# Patient Record
Sex: FEMALE | Race: WHITE | NOT HISPANIC OR LATINO
[De-identification: names, ages, dates, MRNs, and addresses within clinical notes are randomized per-mention and may not be internally consistent; named-entity substitution may affect disease eponyms.]

---

## 2020-08-25 PROBLEM — Z00.00 ENCOUNTER FOR PREVENTIVE HEALTH EXAMINATION: Status: ACTIVE | Noted: 2020-08-25

## 2024-05-07 ENCOUNTER — APPOINTMENT (OUTPATIENT)
Dept: ORTHOPEDIC SURGERY | Facility: CLINIC | Age: 34
End: 2024-05-07

## 2024-06-05 ENCOUNTER — APPOINTMENT (OUTPATIENT)
Dept: ORTHOPEDIC SURGERY | Facility: CLINIC | Age: 34
End: 2024-06-05

## 2024-06-05 DIAGNOSIS — Z78.9 OTHER SPECIFIED HEALTH STATUS: ICD-10-CM

## 2024-06-05 PROCEDURE — 99204 OFFICE O/P NEW MOD 45 MIN: CPT

## 2024-06-05 PROCEDURE — 72050 X-RAY EXAM NECK SPINE 4/5VWS: CPT

## 2024-06-05 PROCEDURE — 72110 X-RAY EXAM L-2 SPINE 4/>VWS: CPT
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
## 2024-06-05 NOTE — PHYSICAL EXAM
[de-identified] :  General: No acute distress, conversant, well-nourished. Head: Normocephalic, atraumatic Neck: trachea midline, FROM Heart: normotensive and normal rate and rhythm Lungs: No labored breathing Skin: No abrasions, no rashes, no edema Psych: Alert and oriented to person, place and time Extremities: no peripheral edema or digital cyanosis Gait: Normal gait. Can perform tandem gait.  Vascular: warm and well perfused distally, palpable distal pulses  MSK: Spine: + tenderness to palpation.  No step-off, no deformity.   NEURO: Sensation          Left           C5     2/2               C6     2/2               C7     2/2               C8     2/2              T1     2/2                        Right         C5     1/2               C6     1/2               C7     2/2               C8     2/2              T1     2/2        Motor:                                                Left             C5 (deltoid abduction)             5/5               C6 (biceps flexion)                   5/5                C7 (triceps extension)             5/5               C8 (finger flexion)                     5/5               T1 (interosseous)                     5/5                                                            Right           C5 (deltoid abduction)             5/5               C6 (biceps flexion)                   5/5                C7 (triceps extension)             5/5               C8 (finger flexion)                     5/5               T1 (interosseous)                     5/5                       Sensation Left L2  -  2/2            Left L3  -  2/2 Left L4  -  1/2 Left L5  -  1/2 Left S1  -  2/2   Right L2  -  2/2            Right L3  -  2/2 Right L4  -  2/2 Right L5  -  2/2 Right S1  -  2/2   Motor: Left L2 (hip flexion)                            5/5                Left L3 (knee extension)                   5/5                Left L4 (ankle dorsiflexion)                 5/5                Left L5 (long toe extensor)                5/5                Left S1 (ankle plantar flexion)           5/5   Right L2 (hip flexion)                            5/5                Right L3 (knee extension)                   5/5                Right L4 (ankle dorsiflexion)                 5/5                Right L5 (long toe extensor)                5/5                Right S1 (ankle plantar flexion)           5/5   Reflexes: Normal and symmetric Negative Spurlings test.  Negative Hoffmans reflex.  Negative clonus.  Down-going Babinski. [de-identified] : I ordered radiographs to evaluate the patient's symptoms.   Cervical 4 view radiographs taken in the office today show no dislocation or fracture. Cervical spondylosis.  No instability on dynamic series. ACDF hardware in appropriate position and alignment.   Lumbar 4 view radiographs taken in the office today show no dislocation or fracture.  Lumbar spondylosis.  No instability on dynamic series. TLIF hardware in appropriate position and alignment.   I independently reviewed CT scan which showed possible pseudoarthrosis of lumbar spine.  I independently reviewed MRI which showed severe foraminal stenosis at L3-L4, above level of her previous surgery.
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
## 2024-06-20 ENCOUNTER — APPOINTMENT (OUTPATIENT)
Dept: CT IMAGING | Facility: CLINIC | Age: 34
End: 2024-06-20
Payer: MEDICARE

## 2024-06-20 ENCOUNTER — APPOINTMENT (OUTPATIENT)
Dept: MRI IMAGING | Facility: CLINIC | Age: 34
End: 2024-06-20
Payer: MEDICARE

## 2024-06-20 PROCEDURE — 72158 MRI LUMBAR SPINE W/O & W/DYE: CPT | Mod: MH

## 2024-06-20 PROCEDURE — 72131 CT LUMBAR SPINE W/O DYE: CPT | Mod: MH

## 2024-06-20 PROCEDURE — A9585: CPT

## 2024-06-24 ENCOUNTER — TRANSCRIPTION ENCOUNTER (OUTPATIENT)
Age: 34
End: 2024-06-24

## 2024-06-24 ENCOUNTER — NON-APPOINTMENT (OUTPATIENT)
Age: 34
End: 2024-06-24

## 2024-06-27 ENCOUNTER — APPOINTMENT (OUTPATIENT)
Dept: ORTHOPEDIC SURGERY | Facility: CLINIC | Age: 34
End: 2024-06-27
Payer: MEDICARE

## 2024-06-27 DIAGNOSIS — M54.50 LOW BACK PAIN, UNSPECIFIED: ICD-10-CM

## 2024-06-27 DIAGNOSIS — Z98.890 OTHER SPECIFIED POSTPROCEDURAL STATES: ICD-10-CM

## 2024-06-27 PROCEDURE — 99204 OFFICE O/P NEW MOD 45 MIN: CPT

## 2024-06-30 PROBLEM — M54.50 LUMBAR BACK PAIN: Status: ACTIVE | Noted: 2024-06-05

## 2024-06-30 PROBLEM — Z98.890 HISTORY OF SPINAL SURGERY: Status: ACTIVE | Noted: 2024-06-05

## 2024-11-13 ENCOUNTER — TRANSCRIPTION ENCOUNTER (OUTPATIENT)
Age: 34
End: 2024-11-13

## 2024-11-19 ENCOUNTER — APPOINTMENT (OUTPATIENT)
Dept: ORTHOPEDIC SURGERY | Facility: CLINIC | Age: 34
End: 2024-11-19

## 2024-11-19 DIAGNOSIS — Z98.1 ARTHRODESIS STATUS: ICD-10-CM

## 2024-11-19 DIAGNOSIS — M54.12 RADICULOPATHY, CERVICAL REGION: ICD-10-CM

## 2024-11-19 PROCEDURE — 99214 OFFICE O/P EST MOD 30 MIN: CPT

## 2024-11-19 RX ORDER — GABAPENTIN 300 MG/1
300 CAPSULE ORAL 3 TIMES DAILY
Qty: 180 | Refills: 1 | Status: ACTIVE | COMMUNITY
Start: 2024-11-19 | End: 1900-01-01

## 2024-11-19 NOTE — REASON FOR VISIT
Thank you!     Thank you for allowing us to provide you with excellent care today. We hope we addressed all of your concerns and needs. We strive to provide excellent quality care in the Emergency Department. You will receive a survey after your visit to evaluate the care you were provided.      Please rate us a level 5 (excellent), as anything less than excellent does not meet our goals.      If you feel that you have not received excellent quality care or timely care, please ask to speak to the nurse manager. Please choose us in the future for your continued health care needs. ______________________________________________________________________    The exam and treatment you received in the Emergency Department were for an urgent problem and are not intended as complete care. It is important that you follow-up with a doctor, nurse practitioner, or physician assistant to:  (1) confirm your diagnosis,  (2) re-evaluation of changes in your illness and treatment, and  (3) for ongoing care. If your symptoms become worse or you do not improve as expected and you are unable to reach your usual health care provider, you should return to the Emergency Department. We are available 24 hours a day. Take this sheet with you when you go to your follow-up visit. If you have any problem arranging the follow-up visit, contact [address] [place] [address] 21 [phone])    Make an appointment with your Primary Care doctor for follow up of this visit. Return to the ER if you are unable to be seen in the time recommended on your discharge instructions. White: Instrucciones de cuidado - [ Cuts: Care Instructions ]  Instrucciones de cuidado  Un juliocesar puede ocurrir en cualquier parte del cuerpo. A veces, se usan puntos de sutura, grapas, [employer] para la piel o cintas Jaama [address] llamadas Steri-Strips para mantener juntos los bordes de un juliocesar y ayudar a que sane.  Las cintas Steri-Strip pueden usarse por sí solas o junto con puntos de sutura o [Follow-Up Visit] : a follow-up visit for [Back Pain] : back pain grapas. Otras veces, se jose luis los deutsch abiertos. Si el juliocesar es profundo y [employer], es posible que el médico haya cerrado el juliocesar en dos capas. Gem capa más profunda de puntos de sutura junta la parte profunda del juliocesar. Estos puntos se disuelven y no es necesario extraerlos. El cierre de la capa superior, que puede [place] por medio de anup, [place], Steri-Strips o Tor, es lo que se puede natividad sobre el juliocesar. Con frecuencia, un juliocesar se cubre con gem venda. El médico lo ha examinado minuciosamente, nicole pueden presentarse problemas más tarde. Si nota algún problema o nuevos síntomas, busque tratamiento médico de inmediato. La atención de seguimiento es gem parte clave de tomlinson tratamiento y seguridad. Asegúrese de hacer y acudir a todas las citas, y llame a tomlinson médico si está teniendo problemas. También es gem buena idea saber los resultados de laurie exámenes y mantener gem lista de los medicamentos que ryan. ¿Cómo puede cuidarse en el hogar? Si un juliocesar está abierto o cerrado  · Apoye la piyush afectada sobre gem almohada en cualquier momento que esté sentado o acostado antwon los 3 días siguientes. Trate de mantenerla por encima del nivel del corazón. [place] ayudará a reducir la hinchazón. · Mantenga la herida seca antwon las primeras 24 a 48 horas. Después de [employer], puede ducharse si el médico lo [place]. Seque el juliocesar con toques suaves de toalla. · No remoje el juliocesar, attila en gem addison (bañera). [place] médico le indicará cuándo es seguro mojar el juliocesar. · Después de las primeras 24 a 48 horas, limpie el juliocesar con agua y jabón 2 veces al día, a menos que el médico le dé indicaciones diferentes. ? No use peróxido de hidrógeno (agua [place]) ni alcohol, los cuales pueden retrasar la sanación. ? Puede cubrir el juliocesar con gem capa delgada de vaselina y gem venda antiadherente.   ? Si el médico colocó gem venda sobre el juliocesar, colóquese gem venda nueva después de limpiar el juliocesar o si la [FreeTextEntry2] : MRI REVIEW [facility] venda se moja o se ensucia. · Evite cualquier actividad que pudiera hacer que el juliocesar se hamida de nuevo. · Sea kip con los medicamentos. Jeny y siga todas las indicaciones de la [place]. ? Si el médico le recetó un analgésico (medicamento para el dolor), tómelo según las indicaciones. ? Si no está tomando un analgésico recetado, pregúntele a tomlinson médico si puede demetrius carey de [employer]. Si se cerró el juliocesar con puntos, grapas o Steri-Strips  · Siga las instrucciones anteriores para los deutsch abiertos o cerrados. · No se quite los puntos o las grapas por sí mismo. El médico le indicará cuándo debe volver para que le quiten los [address] grapas. · Deje puestas las Steri-Strips hasta que se desprendan por sí solas. Si se cerró el juliocesar con un adhesivo para la piel  · Siga las instrucciones anteriores para los deutsch abiertos o cerrados. · Déjese puesto el [employer] sobre la piel hasta que se desprenda por sí solo. [place] puede tardar entre 5 y 7 días. · No se rasque, frote ni hurgue el [employer]. · No se aplique la parte pegajosa de lulu venda directamente sobre el [employer]. · No se aplique ningún tipo de pomada, crema o loción sobre la piyush. [place] puede hacer que el [employer] se desprenda demasiado pronto. No use peróxido de hidrógeno (agua [place]) ni alcohol, los cuales pueden retrasar la sanación. ¿Cuándo debes pedir ayuda? Llama a tu médico ahora mismo o busca atención médica inmediata si:    · Tienes dolor nuevo, o el dolor empeora.     · La piel cerca del juliocesar está fría o pálida, o cambia de color.     · Sientes hormigueo, debilitamiento o entumecimiento cerca del juliocesar.     · El juliocesar comienza a sangrar, y la rossi empapa la venda. Es normal que salga lulu pequeña cantidad de rossi.     · Tienes dificultades para  la piyush cercana al juliocesar.     · Tienes síntomas de infección, tales attila:  ? Aumento del dolor, la hinchazón, el enrojecimiento o la temperatura alrededor del juliocesar. ?  Vetas rojizas que comienzan en el juliocesar. ? Pus que sale del juliocesar. ? [place] especial atención a los cambios en tu tao y asegúrate de comunicarte con tu médico si:    · El juliocesar vuelve a abrirse.     · No mejoras attila se esperaba. ¿Dónde puede encontrar más información en inglés? Fani edwards http://hugo-clair.info/. Michell Countess M735 en la búsqueda para aprender más acerca de \"White: Instrucciones de cuidado - [ Cuts: Care Instructions ]. \"  Revisado: 20 noviembre, 2017  Versión del contenido: 11.8  © 3632-8089 Healthwise, Incorporated. Las instrucciones de cuidado fueron adaptadas bajo licencia por [employer] (which disclaims liability or warranty for this information). Si usted tiene [place] afección médica o sobre estas instrucciones, siempre pregunte a tomlinson profesional de tao. [employer] niega toda garantía o responsabilidad por tomlinson uso de esta información.

## 2025-01-29 ENCOUNTER — APPOINTMENT (OUTPATIENT)
Dept: ORTHOPEDIC SURGERY | Facility: CLINIC | Age: 35
End: 2025-01-29
Payer: MEDICARE

## 2025-01-29 DIAGNOSIS — M54.12 RADICULOPATHY, CERVICAL REGION: ICD-10-CM

## 2025-01-29 DIAGNOSIS — M54.50 LOW BACK PAIN, UNSPECIFIED: ICD-10-CM

## 2025-01-29 DIAGNOSIS — Z98.890 OTHER SPECIFIED POSTPROCEDURAL STATES: ICD-10-CM

## 2025-01-29 DIAGNOSIS — Z98.1 ARTHRODESIS STATUS: ICD-10-CM

## 2025-01-29 PROCEDURE — 99214 OFFICE O/P EST MOD 30 MIN: CPT | Mod: 2W

## 2025-01-29 RX ORDER — METHYLPREDNISOLONE 4 MG/1
4 TABLET ORAL
Qty: 1 | Refills: 0 | Status: ACTIVE | COMMUNITY
Start: 2025-01-29 | End: 1900-01-01
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
## 2025-02-03 NOTE — PHYSICAL EXAM
[de-identified] : General: NAD AAOx4, well-appearing Respiratory: No visible respiratory distress Psych: Good mood and appropriate affect Skin: WWP, no rashes Gait: Normal gait, can do tandem gait MSK: Spine: no visible deformity, describes neck and back pain Neuro: denies numbness, grossly moving all extremities [de-identified] : I independently reviewed MRI which showed degenerative changes without compression of the neural elements.

## 2025-02-03 NOTE — PHYSICAL EXAM
[de-identified] : General: NAD AAOx4, well-appearing Respiratory: No visible respiratory distress Psych: Good mood and appropriate affect Skin: WWP, no rashes Gait: Normal gait, can do tandem gait MSK: Spine: no visible deformity, describes neck and back pain Neuro: denies numbness, grossly moving all extremities [de-identified] : I independently reviewed MRI which showed degenerative changes without compression of the neural elements.
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
## 2025-07-01 ENCOUNTER — NON-APPOINTMENT (OUTPATIENT)
Age: 35
End: 2025-07-01

## 2025-07-01 ENCOUNTER — APPOINTMENT (OUTPATIENT)
Dept: OTOLARYNGOLOGY | Facility: CLINIC | Age: 35
End: 2025-07-01

## 2025-07-01 PROBLEM — J34.2 DNS (DEVIATED NASAL SEPTUM): Status: ACTIVE | Noted: 2025-07-01

## 2025-07-01 PROBLEM — R60.0 SALIVARY GLAND SWELLING: Status: ACTIVE | Noted: 2025-07-01

## 2025-07-01 PROCEDURE — 31575 DIAGNOSTIC LARYNGOSCOPY: CPT

## 2025-07-01 PROCEDURE — 99203 OFFICE O/P NEW LOW 30 MIN: CPT | Mod: 25

## 2025-07-01 NOTE — ASSESSMENT
[FreeTextEntry1] : Little patient was referred from [facility] where she was diagnosed with a right parotitis treated with amoxicillin and told to follow-up and see a ENT specialist.  She is tremendously better she  and still swollen the right side she was recommended should she suck on lemon drops and stay hydrated she also was complaining of some difficulty breathing through her nose endoscopically she has a deviated septum to the right.  Fiberoptic evaluation of the larynx is otherwise normal and on palpation there is no evidence of any palpable stone but still a slightly tender right parotid gland.  She will follow-up with one of our salivary gland specialists out in [place] and will continue finishing her antibiotics and continuing the regimen for sialoadenitis.

## 2025-07-01 NOTE — PHYSICAL EXAM
[Midline] : trachea located in midline position [Normal] : no rashes [Nasal Endoscopy Performed] : nasal endoscopy was performed, see procedure section for findings [] : septum deviated to the right [de-identified] : right facial swelling and tenderjess extending down the right neck

## 2025-07-01 NOTE — END OF VISIT
[FreeTextEntry3] : I personally saw and examined KEVIN CONNELLY in detail this visit today. I personally reviewed the HPI, PMH, FH, SH, ROS, and medications/allergies. I have spoken to PETER Lyle regarding the history and have personally determined the assessment and plan of care and documented this myself. I was present and participated in all key portions of the encounter and all procedures noted above. I have made changes in the body of the note where appropriate.

## 2025-07-01 NOTE — HISTORY OF PRESENT ILLNESS
[de-identified] : Patient had right facial swelling about 5 days ago and went to the ER at [facility] where they said that she had a salivary gland infection and stones. She was given amoxicillin for 14 days which she is still on and she is still sucking on lemon drops. She still has tenderness and swelling of the right cheek. She saw some jelly looking discharge come out of the gland. She still has tenderness and a swelling along the right neck area  She has never had this happen before

## 2025-07-01 NOTE — CONSULT LETTER
[Dear  ___] : Dear  [unfilled], [Consult Letter:] : I had the pleasure of evaluating your patient, [unfilled]. [Consult Closing:] : Thank you very much for allowing me to participate in the care of this patient.  If you have any questions, please do not hesitate to contact me. [Sincerely,] : Sincerely, [FreeTextEntry3] : Claudio Millan MD [facility] Associated Professor, [employer]

## 2025-07-08 ENCOUNTER — NON-APPOINTMENT (OUTPATIENT)
Age: 35
End: 2025-07-08

## 2025-08-08 ENCOUNTER — APPOINTMENT (OUTPATIENT)
Dept: OTOLARYNGOLOGY | Facility: CLINIC | Age: 35
End: 2025-08-08
Payer: MEDICARE

## 2025-08-08 DIAGNOSIS — M26.609 UNSPECIFIED TEMPOROMANDIBULAR JOINT DISORDER: ICD-10-CM

## 2025-08-08 DIAGNOSIS — K11.5 SIALOLITHIASIS: ICD-10-CM

## 2025-08-08 DIAGNOSIS — K21.9 GASTRO-ESOPHAGEAL REFLUX DISEASE W/OUT ESOPHAGITIS: ICD-10-CM

## 2025-08-08 PROCEDURE — 99214 OFFICE O/P EST MOD 30 MIN: CPT | Mod: 25

## 2025-08-08 PROCEDURE — 31575 DIAGNOSTIC LARYNGOSCOPY: CPT

## 2025-08-08 RX ORDER — SULFAMETHOXAZOLE AND TRIMETHOPRIM 400; 80 MG/1; MG/1
400-80 TABLET ORAL TWICE DAILY
Qty: 20 | Refills: 0 | Status: ACTIVE | COMMUNITY
Start: 2025-08-08 | End: 1900-01-01

## 2025-08-08 RX ORDER — METHYLPREDNISOLONE 4 MG/1
4 TABLET ORAL
Qty: 1 | Refills: 0 | Status: ACTIVE | COMMUNITY
Start: 2025-08-08 | End: 1900-01-01

## 2025-08-08 RX ORDER — ESOMEPRAZOLE MAGNESIUM 40 MG/1
40 CAPSULE, DELAYED RELEASE ORAL
Qty: 60 | Refills: 0 | Status: ACTIVE | COMMUNITY
Start: 2025-08-08 | End: 1900-01-01